# Patient Record
Sex: FEMALE | Employment: PART TIME | URBAN - METROPOLITAN AREA
[De-identification: names, ages, dates, MRNs, and addresses within clinical notes are randomized per-mention and may not be internally consistent; named-entity substitution may affect disease eponyms.]

---

## 2020-11-17 NOTE — PROGRESS NOTES
Chief Complaint    No chief complaint on file. History of Present Illness:  Trini Bey is a 48 y.o. femalehere for evaluation chief complaint of left ankle pain. She states that in 2005 she had a left distal fibula fracture that underwent open reduction internal fixation by Dr. Kristie Prakash. Most of the time she is pretty good but she gets intervals where her ankle will flareup and she will have pain just below the distal tip of the fibula along the lateral side of the foot. She states she would like her hardware taken out because she knows that is the problem. At times it can be as painful as an 8 out of 10. There is not anything in particular that sets it off and she has no other joints that are painful. She is a pharmacist.        Medical History:  Patient's medications, allergies, past medical, surgical, social and family histories were reviewed and updated as appropriate. Review of Systems:  Pertinent items are noted in HPI  Review of systems reviewed from Patient History Form dated on 11/18/2020 and available in the patient's chart under the Media tab. Vital Signs: There were no vitals taken for this visit. General Exam:   Constitutional: Patient is adequately groomed with no evidence of malnutrition  DTRs: Deep tendon reflexes are intact  Mental Status: The patient is oriented to time, place and person. The patient's mood and affect are appropriate. Lymphatic: The lymphatic examination bilaterally reveals all areas to be without enlargement or induration. Foot Examination:    Inspection: No significant swelling erythema or ecchymosis. Her hardware is visible laterally    Palpation: Tenderness over the lateral aspect of the ankle and to a lesser extent along the peroneal tendons    Range of Motion: 20 degrees of dorsiflexion 50 degrees of plantarflexion    Strength:  Within normal limits with mild tenderness to plantarflexion of the first ray plantarflexion and dorsiflexion eversion of the ankle    Special Tests: Anterior drawer and talar tilt show instability of the right ankle while on the left side it is a little bit tighter. She does have some generalized laxity with hyperextension at the elbow    Skin: There are no rashes, ulcerations or lesions. Gait: Antalgic    Reflex 2+ and symmetric    Additional Comments:       Additional Examinations:         Right Lower Extremity: Examination of the right lower extremity does not show any tenderness, deformity or injury. Range of motion is unremarkable. There is no gross instability. There are no rashes, ulcerations or lesions. Strength and tone are normal.    Radiology:     X-rays obtained and reviewed in office:  Views 3  Location left ankle  Impression shows that her mortise is well reduced and symmetric there is almost no signs of posttraumatic arthritis or hardware in the fibula shows no evidence of loosening and no hardware failure. Assessment : This patient has previous fibula fracture with intermittent pain laterally. Her joint looks really good. She would like the hardware out    Impression:  No diagnosis found. Office Procedures:  No orders of the defined types were placed in this encounter. Treatment Plan:  The etiology of painful hardware peroneal tendon injury and it's appropriate treatment were discussed in great detail. We discussed operative and nonoperative treatments. She states she would like the hardware out and did not want any kind of bone filler. In further discussion she states she does not want to get Covid tested and also does not plan on getting any kind of vaccine.   We discussed our Covid testing policy and at this point she is going to follow-up with me as needed

## 2020-11-18 ENCOUNTER — OFFICE VISIT (OUTPATIENT)
Dept: ORTHOPEDIC SURGERY | Age: 50
End: 2020-11-18
Payer: COMMERCIAL

## 2020-11-18 VITALS — WEIGHT: 155 LBS | BODY MASS INDEX: 28.52 KG/M2 | HEIGHT: 62 IN

## 2020-11-18 PROCEDURE — 99203 OFFICE O/P NEW LOW 30 MIN: CPT | Performed by: ORTHOPAEDIC SURGERY
